# Patient Record
Sex: MALE | Race: WHITE
[De-identification: names, ages, dates, MRNs, and addresses within clinical notes are randomized per-mention and may not be internally consistent; named-entity substitution may affect disease eponyms.]

---

## 2020-10-05 ENCOUNTER — HOSPITAL ENCOUNTER (OUTPATIENT)
Dept: HOSPITAL 35 - ULTRA | Age: 80
End: 2020-10-05
Attending: FAMILY MEDICINE
Payer: COMMERCIAL

## 2020-10-05 DIAGNOSIS — M79.89: Primary | ICD-10-CM

## 2020-10-09 ENCOUNTER — HOSPITAL ENCOUNTER (EMERGENCY)
Dept: HOSPITAL 35 - ER | Age: 80
Discharge: HOME | End: 2020-10-09
Payer: COMMERCIAL

## 2020-10-09 VITALS — HEIGHT: 70 IN | WEIGHT: 170 LBS | BODY MASS INDEX: 24.34 KG/M2

## 2020-10-09 VITALS — DIASTOLIC BLOOD PRESSURE: 69 MMHG | SYSTOLIC BLOOD PRESSURE: 140 MMHG

## 2020-10-09 DIAGNOSIS — K21.9: ICD-10-CM
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
## 2020-10-09 DIAGNOSIS — I48.91: ICD-10-CM

## 2020-10-09 DIAGNOSIS — Z90.89: ICD-10-CM
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
## 2020-10-09 DIAGNOSIS — Z79.899: ICD-10-CM

## 2020-10-09 DIAGNOSIS — I10: ICD-10-CM

## 2020-10-09 DIAGNOSIS — S80.11XA: Primary | ICD-10-CM

## 2020-10-09 DIAGNOSIS — Z87.891: ICD-10-CM

## 2020-10-09 DIAGNOSIS — Z88.0: ICD-10-CM

## 2020-10-09 LAB
ANION GAP SERPL CALC-SCNC: 6 MMOL/L (ref 7–16)
APTT BLD: 37.8 SECONDS (ref 24.5–32.8)
BUN SERPL-MCNC: 25 MG/DL (ref 7–18)
CALCIUM SERPL-MCNC: 9.4 MG/DL (ref 8.5–10.1)
CHLORIDE SERPL-SCNC: 99 MMOL/L (ref 98–107)
CO2 SERPL-SCNC: 31 MMOL/L (ref 21–32)
CREAT SERPL-MCNC: 1 MG/DL (ref 0.7–1.3)
ERYTHROCYTE [DISTWIDTH] IN BLOOD BY AUTOMATED COUNT: 14.6 % (ref 10.5–14.5)
GLUCOSE SERPL-MCNC: 126 MG/DL (ref 74–106)
HCT VFR BLD CALC: 33.7 % (ref 42–52)
HGB BLD-MCNC: 11 GM/DL (ref 14–18)
INR PPP: 1.5
MCH RBC QN AUTO: 31.1 PG (ref 26–34)
MCHC RBC AUTO-ENTMCNC: 32.8 G/DL (ref 28–37)
MCV RBC: 95 FL (ref 80–100)
PLATELET # BLD: 322 THOU/UL (ref 150–400)
POTASSIUM SERPL-SCNC: 4.4 MMOL/L (ref 3.5–5.1)
PROTHROMBIN TIME: 14.9 SECONDS (ref 9.3–11.4)
RBC # BLD AUTO: 3.55 MIL/UL (ref 4.5–6)
SODIUM SERPL-SCNC: 136 MMOL/L (ref 136–145)
WBC # BLD AUTO: 10.3 THOU/UL (ref 4–11)

## 2021-12-13 ENCOUNTER — HOSPITAL ENCOUNTER (INPATIENT)
Dept: HOSPITAL 35 - ER | Age: 81
LOS: 4 days | Discharge: HOME | DRG: 812 | End: 2021-12-17
Attending: INTERNAL MEDICINE | Admitting: INTERNAL MEDICINE
Payer: COMMERCIAL

## 2021-12-13 VITALS — SYSTOLIC BLOOD PRESSURE: 147 MMHG | DIASTOLIC BLOOD PRESSURE: 57 MMHG

## 2021-12-13 VITALS — DIASTOLIC BLOOD PRESSURE: 66 MMHG | SYSTOLIC BLOOD PRESSURE: 111 MMHG

## 2021-12-13 VITALS — BODY MASS INDEX: 26.34 KG/M2 | HEIGHT: 70 IN | WEIGHT: 184 LBS

## 2021-12-13 VITALS — SYSTOLIC BLOOD PRESSURE: 112 MMHG | DIASTOLIC BLOOD PRESSURE: 60 MMHG

## 2021-12-13 VITALS — SYSTOLIC BLOOD PRESSURE: 125 MMHG | DIASTOLIC BLOOD PRESSURE: 62 MMHG

## 2021-12-13 DIAGNOSIS — Z86.73: ICD-10-CM

## 2021-12-13 DIAGNOSIS — Z20.822: ICD-10-CM

## 2021-12-13 DIAGNOSIS — E53.8: ICD-10-CM

## 2021-12-13 DIAGNOSIS — K21.9: ICD-10-CM

## 2021-12-13 DIAGNOSIS — D50.9: Primary | ICD-10-CM

## 2021-12-13 DIAGNOSIS — I48.91: ICD-10-CM

## 2021-12-13 DIAGNOSIS — Z88.0: ICD-10-CM

## 2021-12-13 DIAGNOSIS — K92.2: ICD-10-CM

## 2021-12-13 DIAGNOSIS — Z98.42: ICD-10-CM

## 2021-12-13 DIAGNOSIS — E03.9: ICD-10-CM

## 2021-12-13 DIAGNOSIS — Z87.891: ICD-10-CM

## 2021-12-13 DIAGNOSIS — N40.0: ICD-10-CM

## 2021-12-13 DIAGNOSIS — Z98.52: ICD-10-CM

## 2021-12-13 DIAGNOSIS — Z98.41: ICD-10-CM

## 2021-12-13 DIAGNOSIS — Z79.01: ICD-10-CM

## 2021-12-13 DIAGNOSIS — R53.81: ICD-10-CM

## 2021-12-13 LAB
ANION GAP SERPL CALC-SCNC: 10 MMOL/L (ref 7–16)
APTT BLD: 20.8 SECONDS (ref 24.5–32.8)
BUN SERPL-MCNC: 55 MG/DL (ref 7–18)
CALCIUM SERPL-MCNC: 8.5 MG/DL (ref 8.5–10.1)
CHLORIDE SERPL-SCNC: 105 MMOL/L (ref 98–107)
CO2 SERPL-SCNC: 26 MMOL/L (ref 21–32)
CREAT SERPL-MCNC: 1.3 MG/DL (ref 0.7–1.3)
ERYTHROCYTE [DISTWIDTH] IN BLOOD BY AUTOMATED COUNT: 15.4 % (ref 10.5–14.5)
GLUCOSE SERPL-MCNC: 175 MG/DL (ref 74–106)
HCT VFR BLD CALC: 17.9 % (ref 42–52)
HCT VFR BLD CALC: 18.3 % (ref 42–52)
HGB BLD-MCNC: 5.8 GM/DL (ref 14–18)
HGB BLD-MCNC: 5.9 GM/DL (ref 14–18)
INR PPP: 1.11
MCH RBC QN AUTO: 30 PG (ref 26–34)
MCHC RBC AUTO-ENTMCNC: 32.2 G/DL (ref 28–37)
MCV RBC: 93.3 FL (ref 80–100)
PLATELET # BLD: 278 THOU/UL (ref 150–400)
POTASSIUM SERPL-SCNC: 4.5 MMOL/L (ref 3.5–5.1)
PROTHROMBIN TIME: 12 SECONDS (ref 10.5–12.1)
RBC # BLD AUTO: 1.96 MIL/UL (ref 4.5–6)
SODIUM SERPL-SCNC: 141 MMOL/L (ref 136–145)
WBC # BLD AUTO: 11.5 THOU/UL (ref 4–11)

## 2021-12-13 PROCEDURE — 10045: CPT

## 2021-12-13 PROCEDURE — 62110: CPT

## 2021-12-13 PROCEDURE — 62900: CPT

## 2021-12-13 PROCEDURE — 70005: CPT

## 2021-12-14 VITALS — SYSTOLIC BLOOD PRESSURE: 109 MMHG | DIASTOLIC BLOOD PRESSURE: 65 MMHG

## 2021-12-14 VITALS — DIASTOLIC BLOOD PRESSURE: 54 MMHG | SYSTOLIC BLOOD PRESSURE: 118 MMHG

## 2021-12-14 VITALS — DIASTOLIC BLOOD PRESSURE: 69 MMHG | SYSTOLIC BLOOD PRESSURE: 103 MMHG

## 2021-12-14 VITALS — SYSTOLIC BLOOD PRESSURE: 107 MMHG | DIASTOLIC BLOOD PRESSURE: 63 MMHG

## 2021-12-14 VITALS — DIASTOLIC BLOOD PRESSURE: 61 MMHG | SYSTOLIC BLOOD PRESSURE: 103 MMHG

## 2021-12-14 VITALS — SYSTOLIC BLOOD PRESSURE: 111 MMHG | DIASTOLIC BLOOD PRESSURE: 60 MMHG

## 2021-12-14 VITALS — SYSTOLIC BLOOD PRESSURE: 106 MMHG | DIASTOLIC BLOOD PRESSURE: 66 MMHG

## 2021-12-14 LAB
ANION GAP SERPL CALC-SCNC: 7 MMOL/L (ref 7–16)
BASOPHILS NFR BLD AUTO: 0.6 % (ref 0–2)
BUN SERPL-MCNC: 47 MG/DL (ref 7–18)
CALCIUM SERPL-MCNC: 7.8 MG/DL (ref 8.5–10.1)
CHLORIDE SERPL-SCNC: 108 MMOL/L (ref 98–107)
CO2 SERPL-SCNC: 27 MMOL/L (ref 21–32)
CREAT SERPL-MCNC: 1 MG/DL (ref 0.7–1.3)
EOSINOPHIL NFR BLD: 0.3 % (ref 0–3)
ERYTHROCYTE [DISTWIDTH] IN BLOOD BY AUTOMATED COUNT: 15.2 % (ref 10.5–14.5)
FERRITIN SERPL-MCNC: 10 NG/ML (ref 26–388)
GLUCOSE SERPL-MCNC: 113 MG/DL (ref 74–106)
GRANULOCYTES NFR BLD MANUAL: 80.7 % (ref 36–66)
HCT VFR BLD CALC: 19.4 % (ref 42–52)
HGB BLD-MCNC: 6.5 GM/DL (ref 14–18)
IRON SERPL-MCNC: 29 UG/DL (ref 65–175)
LYMPHOCYTES NFR BLD AUTO: 9.9 % (ref 24–44)
MAGNESIUM SERPL-MCNC: 1.9 MG/DL (ref 1.8–2.4)
MCH RBC QN AUTO: 31.4 PG (ref 26–34)
MCHC RBC AUTO-ENTMCNC: 33.6 G/DL (ref 28–37)
MCV RBC: 93.4 FL (ref 80–100)
MONOCYTES NFR BLD: 8.5 % (ref 1–8)
NEUTROPHILS # BLD: 6.9 THOU/UL (ref 1.4–8.2)
PLATELET # BLD: 201 THOU/UL (ref 150–400)
POTASSIUM SERPL-SCNC: 3.8 MMOL/L (ref 3.5–5.1)
RBC # BLD AUTO: 2.08 MIL/UL (ref 4.5–6)
SAO2 % BLD FROM PO2: 10 % (ref 20–39)
SODIUM SERPL-SCNC: 142 MMOL/L (ref 136–145)
TIBC SERPL-MCNC: 305 UG/DL (ref 250–450)
VIT B12 SERPL-MCNC: 235 PG/ML (ref 193–986)
WBC # BLD AUTO: 8.6 THOU/UL (ref 4–11)

## 2021-12-14 PROCEDURE — 0W3P8ZZ CONTROL BLEEDING IN GASTROINTESTINAL TRACT, VIA NATURAL OR ARTIFICIAL OPENING ENDOSCOPIC: ICD-10-PCS | Performed by: INTERNAL MEDICINE

## 2021-12-14 PROCEDURE — 30233N1 TRANSFUSION OF NONAUTOLOGOUS RED BLOOD CELLS INTO PERIPHERAL VEIN, PERCUTANEOUS APPROACH: ICD-10-PCS | Performed by: INTERNAL MEDICINE

## 2021-12-14 NOTE — NUR
[age] YE[age] OLD MALE ADMITTED TO WEST ROOM 463. PT WAS BROUGHT INTO THE
ER PER WIFE DUE CONSIPATION, WEAKNESS AND DARK TARY STOOLS. PT HAD EGD TODAY.
VSS. PT DENIES PAIN/SOA AT THIS TIME. PT UP WITH STANDBY ASSIST. PT WUFE
AT BEDSIDE. WILL CONTINUE TO MONITOR.

## 2021-12-14 NOTE — EKG
[place] [address]
[place]  [address]
Phone:  [phone]                    ELECTROCARDIOGRAM REPORT      
_______________________________________________________________________________
 
Name:       AZ FRITZ         Room #:         170-2       ADM IN  
M.R.#:      [id number]     Account #:      [id number]  
Admission:  21    Attend Phys:    Anne-Marie Smith
Discharge:              Date of Birth:  [birth year]  
                                                          Report #: [id number]
   [id number]
_______________________________________________________________________________
                         [facility] ED
                                       
Test Date:    2021               Test Time:    14:44:05
Pat Name:     AZ FRITZ          Department:   
Patient ID:   [id number]            Room:         170 2
Gender:       M                        Technician:   WESTLEY
:          1940               Requested By: Zara Knowles
Order Number: [id number] MD:   Blake Lindquist
                                 Measurements
Intervals                              Axis          
Rate:         89                       P:            
ME:                                    QRS:          49
QRSD:         143                      T:            257
QT:           398                                    
QTc:          485                                    
                           Interpretive Statements
Atrial fibrillation
Right bundle branch block
ST depr, consider ischemia, inferior and lateral leads
Compared to ECG 2014 10:51:20
ST depression is now present
Electronically Signed On 2021 7:52:16 CST by Blake Lindquist
[email or web address]
 
 
 
 
 
 
 
 
 
 
 
 
 
 
 
 
 
 
 
 
  <ELECTRONICALLY SIGNED>
   By: Blake Lindquist MD, [facility]   
  21     [id number]
D: 21 1444                           _____________________________________
T: 21 1444                           Blake Lindquist MD, [facility]     /EPI

## 2021-12-15 VITALS — DIASTOLIC BLOOD PRESSURE: 57 MMHG | SYSTOLIC BLOOD PRESSURE: 98 MMHG

## 2021-12-15 VITALS — SYSTOLIC BLOOD PRESSURE: 103 MMHG | DIASTOLIC BLOOD PRESSURE: 68 MMHG

## 2021-12-15 VITALS — DIASTOLIC BLOOD PRESSURE: 62 MMHG | SYSTOLIC BLOOD PRESSURE: 98 MMHG

## 2021-12-15 VITALS — DIASTOLIC BLOOD PRESSURE: 53 MMHG | SYSTOLIC BLOOD PRESSURE: 104 MMHG

## 2021-12-15 VITALS — SYSTOLIC BLOOD PRESSURE: 103 MMHG | DIASTOLIC BLOOD PRESSURE: 66 MMHG

## 2021-12-15 VITALS — SYSTOLIC BLOOD PRESSURE: 105 MMHG | DIASTOLIC BLOOD PRESSURE: 66 MMHG

## 2021-12-15 VITALS — SYSTOLIC BLOOD PRESSURE: 112 MMHG | DIASTOLIC BLOOD PRESSURE: 58 MMHG

## 2021-12-15 VITALS — SYSTOLIC BLOOD PRESSURE: 102 MMHG | DIASTOLIC BLOOD PRESSURE: 59 MMHG

## 2021-12-15 LAB
ANION GAP SERPL CALC-SCNC: 8 MMOL/L (ref 7–16)
BUN SERPL-MCNC: 42 MG/DL (ref 7–18)
CALCIUM SERPL-MCNC: 7.9 MG/DL (ref 8.5–10.1)
CHLORIDE SERPL-SCNC: 110 MMOL/L (ref 98–107)
CO2 SERPL-SCNC: 24 MMOL/L (ref 21–32)
CREAT SERPL-MCNC: 1 MG/DL (ref 0.7–1.3)
ERYTHROCYTE [DISTWIDTH] IN BLOOD BY AUTOMATED COUNT: 17 % (ref 10.5–14.5)
ERYTHROCYTE [DISTWIDTH] IN BLOOD BY AUTOMATED COUNT: 17.2 % (ref 10.5–14.5)
GLUCOSE SERPL-MCNC: 108 MG/DL (ref 74–106)
HCT VFR BLD CALC: 19.8 % (ref 42–52)
HCT VFR BLD CALC: 24.3 % (ref 42–52)
HGB BLD-MCNC: 6.6 GM/DL (ref 14–18)
HGB BLD-MCNC: 7.9 GM/DL (ref 14–18)
MCH RBC QN AUTO: 29.6 PG (ref 26–34)
MCH RBC QN AUTO: 30.4 PG (ref 26–34)
MCHC RBC AUTO-ENTMCNC: 32.5 G/DL (ref 28–37)
MCHC RBC AUTO-ENTMCNC: 33.4 G/DL (ref 28–37)
MCV RBC: 90.8 FL (ref 80–100)
MCV RBC: 91 FL (ref 80–100)
PLATELET # BLD: 181 THOU/UL (ref 150–400)
PLATELET # BLD: 183 THOU/UL (ref 150–400)
POTASSIUM SERPL-SCNC: 3.6 MMOL/L (ref 3.5–5.1)
RBC # BLD AUTO: 2.18 MIL/UL (ref 4.5–6)
RBC # BLD AUTO: 2.67 MIL/UL (ref 4.5–6)
SODIUM SERPL-SCNC: 142 MMOL/L (ref 136–145)
WBC # BLD AUTO: 7.6 THOU/UL (ref 4–11)
WBC # BLD AUTO: 8.7 THOU/UL (ref 4–11)

## 2021-12-15 NOTE — NUR
HGB 6.6, ORDERS ARE TO INFUSE BLOOD UNTIL REACHES 7.0  DR. HI HAD CALLED TO
SEE IF PATIENT WAS RECIEVING BLOOD. AT 0940 BLOOD WAS STARTED, PATIENT
MONITORED FOR THE FIRST 15 MINUTES WITH NO REACTIONS. VS CHARTED.

## 2021-12-15 NOTE — NUR
PATIENT AOX4 MAKES NEEDS KNOWN. PATIENT AMBULATES TO THE BATHROOM WITH STEADY
GAITS. PATIENT NEEDS MINIMUM ASSISTANCE WITH ADL, BED MOBILITY, TRANSFER AND
TOILETING. FALL PRECAUTION IN PLACE.PATIENT IN BED ASLEEP AT THIS TIME
BREATHING REGULAR AND UNLABOURED.

## 2021-12-15 NOTE — NUR
PT ADMITTED RELATED TO SYMPTOMATIC ANEMIA, ANTICOAGULATED, GI BLEED. CM
REVIEWED CHART AND SPOKE WITH CARE TEAM. CM MET WITH PT AND SPOUSE AT BEDSIDE
THIS DAY. PT APPEARED TO BE A&O X4. CM ROLE INTRODUCED. PT INDICATED HE LIVES
IN A RANCH STYLE HOUSE WITH IS WIFE WITH 3 STEPS TO ENTER AND NO STEPS INSIDE
.PT INDICATED THAT HE HAD BEEN INDEPDENNT WITH GAIT AND ADLS PTA. PT INDICATED
NO PRIOR USE OF ASSISTIVE DEVICES. THEY HAVE A CANE AND A FWW IN THE HOME. PT
INDICATED NO HH HX. PT HAD BEEN A PT OF DR. JASSO AND IS NOW SEEING NP
CORINA AKERS. PT GOES TO [facility] FOR ALL OTHER CARES. PT INDICATED HE PLANS
TO RETURN HOME ONCE MEDICALLY STABLE. PT GO 1 UNIT PRBC THIS DAY. CM FOLLOWING
REGARDING DC PLANNING.

## 2021-12-16 VITALS — DIASTOLIC BLOOD PRESSURE: 73 MMHG | SYSTOLIC BLOOD PRESSURE: 127 MMHG

## 2021-12-16 VITALS — SYSTOLIC BLOOD PRESSURE: 94 MMHG | DIASTOLIC BLOOD PRESSURE: 53 MMHG

## 2021-12-16 VITALS — DIASTOLIC BLOOD PRESSURE: 55 MMHG | SYSTOLIC BLOOD PRESSURE: 92 MMHG

## 2021-12-16 VITALS — SYSTOLIC BLOOD PRESSURE: 128 MMHG | DIASTOLIC BLOOD PRESSURE: 78 MMHG

## 2021-12-16 VITALS — DIASTOLIC BLOOD PRESSURE: 61 MMHG | SYSTOLIC BLOOD PRESSURE: 111 MMHG

## 2021-12-16 LAB
ANION GAP SERPL CALC-SCNC: 7 MMOL/L (ref 7–16)
BUN SERPL-MCNC: 33 MG/DL (ref 7–18)
CALCIUM SERPL-MCNC: 8 MG/DL (ref 8.5–10.1)
CHLORIDE SERPL-SCNC: 110 MMOL/L (ref 98–107)
CO2 SERPL-SCNC: 25 MMOL/L (ref 21–32)
CREAT SERPL-MCNC: 1 MG/DL (ref 0.7–1.3)
ERYTHROCYTE [DISTWIDTH] IN BLOOD BY AUTOMATED COUNT: 17.2 % (ref 10.5–14.5)
GLUCOSE SERPL-MCNC: 96 MG/DL (ref 74–106)
HCT VFR BLD CALC: 23.3 % (ref 42–52)
HGB BLD-MCNC: 7.8 GM/DL (ref 14–18)
MCH RBC QN AUTO: 30.2 PG (ref 26–34)
MCHC RBC AUTO-ENTMCNC: 33.5 G/DL (ref 28–37)
MCV RBC: 90.2 FL (ref 80–100)
PLATELET # BLD: 180 THOU/UL (ref 150–400)
POTASSIUM SERPL-SCNC: 3.5 MMOL/L (ref 3.5–5.1)
RBC # BLD AUTO: 2.58 MIL/UL (ref 4.5–6)
SODIUM SERPL-SCNC: 142 MMOL/L (ref 136–145)
WBC # BLD AUTO: 6.2 THOU/UL (ref 4–11)

## 2021-12-16 NOTE — NUR
Pt. rested quietly during the night when checked on during frequent rounds.
He requested something to help him sleep last evening.  Karina DYSON called and
notified with new orders (see cpoe).  Pt. ambulated to the bathroom with mini-
mal assistance.  Bed alarm is on.

## 2021-12-16 NOTE — NUR
CARE TEAM INDICATING THAT PT IS PROGRESSING TOWARD GOAL OF DISCHARGING HOME.
PT AND OT ORDERED TO ASSESS PT THIS DAY. CARE TEAM WERE INDICATING PROBABLE DC
BACK HOME TOMORROW. CM FOLLOWOWING REGARDING DC PLANNING.

## 2021-12-17 VITALS — SYSTOLIC BLOOD PRESSURE: 94 MMHG | DIASTOLIC BLOOD PRESSURE: 52 MMHG

## 2021-12-17 VITALS — SYSTOLIC BLOOD PRESSURE: 107 MMHG | DIASTOLIC BLOOD PRESSURE: 52 MMHG

## 2021-12-17 VITALS — DIASTOLIC BLOOD PRESSURE: 75 MMHG | SYSTOLIC BLOOD PRESSURE: 107 MMHG

## 2021-12-17 VITALS — DIASTOLIC BLOOD PRESSURE: 63 MMHG | SYSTOLIC BLOOD PRESSURE: 139 MMHG

## 2021-12-17 VITALS — SYSTOLIC BLOOD PRESSURE: 139 MMHG | DIASTOLIC BLOOD PRESSURE: 63 MMHG

## 2021-12-17 VITALS — SYSTOLIC BLOOD PRESSURE: 105 MMHG | DIASTOLIC BLOOD PRESSURE: 67 MMHG

## 2021-12-17 LAB
ERYTHROCYTE [DISTWIDTH] IN BLOOD BY AUTOMATED COUNT: 17.3 % (ref 10.5–14.5)
HCT VFR BLD CALC: 23.2 % (ref 42–52)
HGB BLD-MCNC: 7.6 GM/DL (ref 14–18)
MCH RBC QN AUTO: 29.9 PG (ref 26–34)
MCHC RBC AUTO-ENTMCNC: 32.9 G/DL (ref 28–37)
MCV RBC: 91.1 FL (ref 80–100)
PLATELET # BLD: 189 THOU/UL (ref 150–400)
RBC # BLD AUTO: 2.54 MIL/UL (ref 4.5–6)
WBC # BLD AUTO: 6.5 THOU/UL (ref 4–11)

## 2021-12-17 PROCEDURE — 0DJ08ZZ INSPECTION OF UPPER INTESTINAL TRACT, VIA NATURAL OR ARTIFICIAL OPENING ENDOSCOPIC: ICD-10-PCS | Performed by: INTERNAL MEDICINE

## 2021-12-17 NOTE — NUR
Pt. rested quietly at intervals during the night when checked on during
frequent rounds.  Meds given for sleep and c/o headache (see emar).